# Patient Record
(demographics unavailable — no encounter records)

---

## 2024-11-13 NOTE — END OF VISIT
[FreeTextEntry3] : Orthopaedic Trauma Surgeon Addendum:  I have personally performed a face-to-face diagnostic evaluation on this patient.  I have reviewed the physician assistant note and agree with the history, exam, and plan of care, except as noted.  Hayden Dangelo MD Orthopaedic Trauma Surgeon [facility] [place] [Time Spent: ___ minutes] : I have spent [unfilled] minutes of time on the encounter which excludes teaching and separately reported services.

## 2024-11-13 NOTE — PHYSICAL EXAM
[de-identified] : General Exam: Appearance: well developed and nourished Orientation: Alert and oriented to person, place, time. Mood: mood and affect well-adjusted, pleasant and cooperative, appropriate for clinical and encounter circumstances  Left Ankle: Skin: small open wound noted to medial aspect of ankle with mild bloody drainage; intact, no rashes or lesions. Inspection: +medial and lateral ankle tenderness; +swelling; normal alignment, no warmth, no masses   Sensation: intact to light touch Pulses: 2+ DP [de-identified] : deferred [de-identified] : 3 views of the left ankle obtained today show stable fixation of fractures

## 2024-11-13 NOTE — DISCUSSION/SUMMARY
[de-identified] : 39yo male who is s/p left ankle ORIF in [place]. He was referred to wound care for further evaluation of medial ankle wound. Patient has a CAM boot already but was advised to hold off on wear until his skin heals. Remain NWB with crutches at this time. Tramadol for pain control PRN. Follow up in 4 weeks.  The patient was given the opportunity to ask questions, and all questions were answered to their satisfaction.
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
## 2025-01-15 NOTE — PHYSICAL EXAM
[de-identified] : Physical Exam: General: Well appearing, no acute distress, A&O Neurologic: A&Ox3, No focal deficits Head: NCAT without abrasions, lacerations, or ecchymosis to head, face, or scalp Respiratory: Equal chest wall expansion bilaterally, no accessory muscle use Lymphatic: No lymphadenopathy palpated Skin: Warm and dry Psychiatric: Normal mood and affect  Examination of the affected leg reveals Medial wound looks significantly improved. Area of concern is warm to touch and tender. No obvious abscess noted. Swelling extends up the leg. Ankle mobility is limited due to swelling. Patient demonstrates stiffness in the affected area. [de-identified] : 3 views of the left ankle were obtained today and compared to previous imaging.  The intramedullary fibular nail is in good position.  The syndesmotic fixation appears stable.  No loss of alignment.  Fracture healing is noted.  Appropriate calcium resorption from the dome of the talus is noted.

## 2025-01-15 NOTE — ASSESSMENT
[FreeTextEntry1] : Assessment:   - Summary: Patient is recovering from a severe lower extremity injury. Current symptoms suggest possible suture reaction or early infection. The wound healing is progressing well overall, but there is concern about a localized area of inflammation.   - Problems:     - Post-operative recovery from severe lower extremity injury     - Possible suture reaction     - Suspected early localized infection     - Reduced mobility and stiffness in affected limb   - Differential Diagnosis:     - Suture reaction     - Early localized infection     - Normal post-operative inflammation     - Deep vein thrombosis

## 2025-01-15 NOTE — HISTORY OF PRESENT ILLNESS
[de-identified] : Summary : Patient presents for follow-up of a lower extremity injury. Reports swelling, numbness, and pain, especially in the morning. Patient also mentions feeling feverish and that the leg feels hot.  - History of Present Illness (HPI) : Patient reports difficulty stepping on the affected leg upon waking. Complains of swelling, numbness, and pain. The pain is described as severe. Patient mentions feeling feverish with the leg feeling hot. Patient noticed a couple of stitches coming out and is concerned about something inside the wound. [Bending] : worsened by bending [Lifting] : worsened by lifting [Weight Bearing] : worsened by weight bearing [Recumbency] : relieved by recumbency [Rest] : relieved by rest

## 2025-01-17 NOTE — PHYSICAL EXAM
[de-identified] : GENERAL APPEARANCE: Well nourished and hydrated, pleasant, alert, and oriented x 3. Appears their stated age.  HEENT: Normocephalic, extraocular eye motion intact. Nasal septum midline. Oral cavity clear. External auditory canal clear.  RESPIRATORY: Breath sounds clear and audible in all lobes. No wheezing, No accessory muscle use. CARDIOVASCULAR: No apparent abnormalities. No lower leg edema. No varicosities. Pedal pulses are palpable. NEUROLOGIC: Sensation is normal, no muscle weakness in the upper or lower extremities. DERMATOLOGIC: No apparent skin lesions, moist, warm, no rash. SPINE: Cervical spine appears normal and moves freely; thoracic spine appears normal and moves freely; lumbosacral spine appears normal and moves freely, normal, nontender. MUSCULOSKELETAL: Hands, wrists, and elbows are normal and move freely, shoulders are normal and move freely.  PSYCHIATRIC: Oriented to person, place, and time, insight and judgement were intact and the affect was normal.   LEFT ankle exam shows mild residual area of swelling localized to an approximate 1 cm area at the superior aspect of the incision, mild tenderness to palpation along the distal fibula, ROM is 030 degrees of plantar flexion, 10 degrees of dorsiflexion, Inversion & Eversion minimal pain.  Minimal pain with passive range of motion Ankle strength: 4/5 plantar flexion, 4/5 dorsiflexion, 4/5 eversion, 4/5 inversion.  No pain with resisted strength testing.  No gross instability.  Calf is supple, nontender. Sensation grossly intact.

## 2025-01-17 NOTE — HISTORY OF PRESENT ILLNESS
[de-identified] : 1/17/25: KASSANDRA is a 40-year-old male does present today for follow-up evaluation of his left ankle status post previous ORIF performed in [place] on 10/29/2024. He was last seen in the office on 1/15/2025. He had noted some irritation, swelling and erythema over the lateral incision site.  He was able to "pop "this area of swelling and did expel some blood and puslike fluid while at home yesterday evening.  He states that his pain to this area has significantly improved since.  He was at physical therapy this morning.  His physical therapist did recommend that he come to the office for wound evaluation. He reports intermittent, mild dull aching pain diffusely about the ankle.  He does note some residual stiffness which has been present at baseline.  He denies any significant warmth or erythema of the ankle currently.  Denies any fever, chills or other constitutional symptoms.

## 2025-01-17 NOTE — DISCUSSION/SUMMARY
[de-identified] : Assessment: 40-year-old male status post previous left ankle ORIF performed in [place] on 10/29/2024 as a result of an open bimalleolar fracture. He does present today with clinical signs and symptoms consistent with a super Dioni suture abscess, although we did discuss that I am somewhat concerned that there may be an underlying deeper infection present. A small quantity of serosanguineous purulent fluid was expressed from an area at this superior third of the incision in the office today.  Patient reports a moderate amount of fluid from this area was expressed last night. Patient currently denies any fever, chills or constitutional symptoms.  Treatment: At this point, we discussed that a deeper infection cannot be excluded.  I had recommended that he seek immediate attention through the emergency department for further evaluation of infection, possible IV antibiotics.  Patient was adamant that he would like to avoid the ER.  The risk of further infection, sepsis, or even limb loss was reviewed with the patient.  He did voice understanding of this.  He states that he did have a similar issue on the medial ankle incision site which healed on its own after a similar experience.  The area was sterilely cleansed with Betadine in the office and a clean dressing was placed over the area.  Should he elect to forego being seen in the ER, I would recommend that he wash over the area once daily with soap and water followed by a Betadine cleanse and he will keep the area covered with Xeroform and island dressing.  Given the fact that he has refused to be seen in the ER have also recommended a 7-day course of oral Bactrim taken twice daily.  We discussed the importance of seeking immediate medical attention should he develop any fever, recurrent fluid collection to the area, or significantly worsening pain to the area.  Both the patient and his family member did voice understanding of this. Patient will follow-up in 1 week should symptoms fail to resolve, or he will seek immediate medical attention should he develop any of the above-mentioned symptoms. All questions answered.

## 2025-02-12 NOTE — HISTORY OF PRESENT ILLNESS
[___ Weeks Post Op] : [unfilled] weeks post op [Xray (Date:___)] : [unfilled] Xray -  [Erythema] : erythematous [Discharge] : noted to have a ~M discharge [Scant] : scant [Neuro Intact] : an unremarkable neurological exam [Vascular Intact] : ~T peripheral vascular exam normal [Negative Jovana's] : maneuvers demonstrated a negative Jovana's sign [Doing Well] : is doing well [Swelling] : not swollen [de-identified] : s/p: 1. Excisional debridement to and including bone, left fibula. 2. Removal of deep implants, left tibia. 3. Soft tissue local advancement flap 4 x 6 cm, left ankle.  DOS: 01/21/2025  s/p: 1. Left distal tibia sequestrectomy. 2. Left distal fibula sequestrectomy. 3. Left ankle arthrotomy with drainage and debridement and synovectomy. 4. Placement of intraosseous antibiotic drug delivery device.  DOS: 02/03/2025 [de-identified] : Patient follows up s/p left ankle sequestrectomy and placement of antibiotic drug delivery device 2/3/25. He states he is feeling well, just worries about his ankle healing properly. He has follow up with infectious disease tomorrow.  [de-identified] : 3 views of the left ankle and 2 views of the left tibia obtained today show stable post operative appearance [de-identified] : Wound is almost healed but we will need suture removal   Next week. [de-identified] : Continue with antibiotics.  Continue nonweightbearing.

## 2025-02-12 NOTE — HISTORY OF PRESENT ILLNESS
[___ Weeks Post Op] : [unfilled] weeks post op [Xray (Date:___)] : [unfilled] Xray -  [Erythema] : erythematous [Discharge] : noted to have a ~M discharge [Scant] : scant [Neuro Intact] : an unremarkable neurological exam [Vascular Intact] : ~T peripheral vascular exam normal [Negative Jovana's] : maneuvers demonstrated a negative Jovana's sign [Doing Well] : is doing well [Swelling] : not swollen [de-identified] : s/p: 1. Excisional debridement to and including bone, left fibula. 2. Removal of deep implants, left tibia. 3. Soft tissue local advancement flap 4 x 6 cm, left ankle.  DOS: 01/21/2025  s/p: 1. Left distal tibia sequestrectomy. 2. Left distal fibula sequestrectomy. 3. Left ankle arthrotomy with drainage and debridement and synovectomy. 4. Placement of intraosseous antibiotic drug delivery device.  DOS: 02/03/2025 [de-identified] : Patient follows up s/p left ankle sequestrectomy and placement of antibiotic drug delivery device 2/3/25. He states he is feeling well, just worries about his ankle healing properly. He has follow up with infectious disease tomorrow.  [de-identified] : 3 views of the left ankle and 2 views of the left tibia obtained today show stable post operative appearance [de-identified] : Wound is almost healed but we will need suture removal   Next week. [de-identified] : Continue with antibiotics.  Continue nonweightbearing.
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
## 2025-02-19 NOTE — HISTORY OF PRESENT ILLNESS
[___ Weeks Post Op] : [unfilled] weeks post op [Erythema] : erythematous [Discharge] : noted to have a ~M discharge [Scant] : scant [Neuro Intact] : an unremarkable neurological exam [Vascular Intact] : ~T peripheral vascular exam normal [Negative Jovana's] : maneuvers demonstrated a negative Jovana's sign [Doing Well] : is doing well [Swelling] : not swollen [de-identified] : s/p: 1. Excisional debridement to and including bone, left fibula. 2. Removal of deep implants, left tibia. 3. Soft tissue local advancement flap 4 x 6 cm, left ankle.  DOS: 01/21/2025  s/p: 1. Left distal tibia sequestrectomy. 2. Left distal fibula sequestrectomy. 3. Left ankle arthrotomy with drainage and debridement and synovectomy. 4. Placement of intraosseous antibiotic drug delivery device.  DOS: 02/03/2025 [de-identified] : Patient follows up s/p left ankle sequestrectomy and placement of antibiotic drug delivery device 2/3/25. He presents today for suture removal.  [de-identified] : Surgical sutures were removed today without complication and steri strips were applied. Continue care with ID. NWB and ROM as tolerated. Follow up 2-3 weeks.

## 2025-05-21 NOTE — PLAN
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  His abdominal wound has a draining sinus which was treated with silver nitrate.  wound is approximately 2 cm deep.  Complete course of antibiotics as prescribed.  Follow-up in 3 to 4 weeks for evaluation of the wound and repeat silver nitrate treatment if indicated.

## 2025-05-21 NOTE — HISTORY OF PRESENT ILLNESS
[FreeTextEntry1] : 40-year-old male who presents for a postoperative visit after undergoing an emergent subtotal colectomy with end ileostomy for severe sepsis and colitis.  He was on long-term antibiotics for hardware infection in his foot and presented with changes in bowel habits, rigors and abdominal pain.  Workup only revealed new colitis and he is infection in his foot had improved.  Other workup for a separate source of his severe sepsis was negative and he was taking urgently to the operating room to remove his colon.  He has had a prolonged course since then and ultimately went to rehab and then discharged home.  He has had intermittent drainage from his midline incision and imaging has shown small abscess in the past without any deeper abdominal space infection.  He was recently treated at [facility] for infection of the incision and treated with a course of antibiotics.

## 2025-05-21 NOTE — PHYSICAL EXAM
[Respiratory Effort] : normal respiratory effort [Calm] : calm [de-identified] : Soft, nontender, nondistended.  Midline incision with 2 sinuses, inferior aspect of the incision has a scab and a second sinus has small purulent drainage and granulation tissue.  It was treated with silver nitrate.  No surrounding fluctuance or erythema.  Ileostomy is healthy and functioning. [de-identified] : In no distress [de-identified] : Normocephalic, atraumatic [de-identified] : On a stretcher [de-identified] : Warm and dry [de-identified] : Alert and oriented x 3

## 2025-05-29 NOTE — ASSESSMENT
[FreeTextEntry1] : Mr. KASSANDRA LUJAN is a 40 year male who presents with    Impression:   Plan:  - Ortho and surg notes reviewed - istop reviewed Reference #: [id number] -     The patient expressed verbal understanding and is in agreement with the plan of care. All of the patient's questions and concerns were addressed during today's visit.

## 2025-05-29 NOTE — REVIEW OF SYSTEMS
[Fever] : no fever [Chills] : no chills [Chest Pain] : no chest pain [Shortness Of Breath] : no shortness of breath [FreeTextEntry9] : +LLE pain
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
## 2025-05-29 NOTE — PHYSICAL EXAM
[FreeTextEntry1] : Constitutional: In NAD, calm and cooperative Heart: well perfused Lungs: nonlabored breathing MSK (Back) Inspection: no gross swelling identified, no scars Palpation: Tenderness of the bilateral lower lumbar paraspinals ROM: Pain at end lumbar extension/flexion Strength: 5/5 strength in bilateral lower extremities Reflexes: 2+ Patella reflex bilaterally, 2+ Achilles reflex bilaterally, negative clonus bilaterally Sensation: Intact to light touch in bilateral lower extremities Special tests: SLR: SANTOSH: FADIR: Pelvic Compression:  Thigh Thrust: Yue's Finger: Facet loading: